# Patient Record
Sex: FEMALE | URBAN - METROPOLITAN AREA
[De-identification: names, ages, dates, MRNs, and addresses within clinical notes are randomized per-mention and may not be internally consistent; named-entity substitution may affect disease eponyms.]

---

## 2021-09-09 ENCOUNTER — APPOINTMENT (RX ONLY)
Dept: URBAN - METROPOLITAN AREA CLINIC 151 | Facility: CLINIC | Age: 71
Setting detail: DERMATOLOGY
End: 2021-09-09

## 2021-09-09 DIAGNOSIS — L20.89 OTHER ATOPIC DERMATITIS: ICD-10-CM

## 2021-09-09 PROBLEM — L20.84 INTRINSIC (ALLERGIC) ECZEMA: Status: ACTIVE | Noted: 2021-09-09

## 2021-09-09 PROCEDURE — 99203 OFFICE O/P NEW LOW 30 MIN: CPT

## 2021-09-09 PROCEDURE — ? PRESCRIPTION

## 2021-09-09 PROCEDURE — ? PRESCRIPTION MEDICATION MANAGEMENT

## 2021-09-09 RX ORDER — TRIAMCINOLONE ACETONIDE 1 MG/G
OINTMENT TOPICAL
Qty: 80 | Refills: 3 | Status: ERX | COMMUNITY
Start: 2021-09-09

## 2021-09-09 RX ORDER — SILVER SULFADIAZINE 10 MG/G
CREAM TOPICAL
Qty: 50 | Refills: 2 | Status: ERX | COMMUNITY
Start: 2021-09-09

## 2021-09-09 RX ADMIN — TRIAMCINOLONE ACETONIDE: 1 OINTMENT TOPICAL at 00:00

## 2021-09-09 RX ADMIN — SILVER SULFADIAZINE: 10 CREAM TOPICAL at 00:00

## 2021-09-09 ASSESSMENT — LOCATION DETAILED DESCRIPTION DERM: LOCATION DETAILED: RIGHT VENTRAL PROXIMAL FOREARM

## 2021-09-09 ASSESSMENT — LOCATION ZONE DERM: LOCATION ZONE: ARM

## 2021-09-09 ASSESSMENT — LOCATION SIMPLE DESCRIPTION DERM: LOCATION SIMPLE: RIGHT FOREARM

## 2021-09-09 NOTE — PROCEDURE: PRESCRIPTION MEDICATION MANAGEMENT
Initiate Treatment: Triamcinolone 0.1% topical ointment QHS under occlusion x/2wks
Detail Level: Simple
Render In Strict Bullet Format?: Yes

## 2021-09-09 NOTE — HPI: OTHER
Condition:: Rash
Please Describe Your Condition:: Following up for an itchy eczema flare up on R forearm, previously used silver sulfadiazine PRN to treat other flares\\nCurrent flare started three weeks ago\\nNo active areas anywhere else\\nTreated w/ prescription topical NOS

## 2021-09-09 NOTE — PROCEDURE: MIPS QUALITY
Quality 226: Preventive Care And Screening: Tobacco Use: Screening And Cessation Intervention: Patient screened for tobacco use and is an ex/non-smoker
Quality 110: Preventive Care And Screening: Influenza Immunization: Influenza Immunization Administered during Influenza season
Quality 47: Advance Care Plan: Advance care planning not documented, reason not otherwise specified.
Quality 431: Preventive Care And Screening: Unhealthy Alcohol Use - Screening: Patient not identified as an unhealthy alcohol user when screened for unhealthy alcohol use using a systematic screening method
Detail Level: Generalized
Quality 130: Documentation Of Current Medications In The Medical Record: Current Medications Documented
Quality 111:Pneumonia Vaccination Status For Older Adults: Pneumococcal Vaccination Previously Received

## 2022-09-06 ENCOUNTER — RX ONLY (OUTPATIENT)
Age: 72
Setting detail: RX ONLY
End: 2022-09-06

## 2022-09-06 RX ORDER — SILVER SULFADIAZINE 10 MG/G
CREAM TOPICAL
Qty: 50 | Refills: 0 | Status: ERX

## 2022-10-18 ENCOUNTER — RX ONLY (OUTPATIENT)
Age: 72
Setting detail: RX ONLY
End: 2022-10-18

## 2022-10-18 RX ORDER — SILVER SULFADIAZINE 10 MG/G
CREAM TOPICAL
Qty: 50 | Refills: 2 | Status: ERX

## 2023-10-27 ENCOUNTER — RX ONLY (OUTPATIENT)
Age: 73
Setting detail: RX ONLY
End: 2023-10-27

## 2023-10-27 RX ORDER — SILVER SULFADIAZINE 10 MG/G
CREAM TOPICAL
Qty: 50 | Refills: 0 | Status: ERX

## 2024-02-27 ENCOUNTER — CONSULTATION (OUTPATIENT)
Dept: URBAN - METROPOLITAN AREA CLINIC 88 | Facility: CLINIC | Age: 74
End: 2024-02-27

## 2024-02-27 DIAGNOSIS — H35.363: ICD-10-CM

## 2024-02-27 DIAGNOSIS — H35.033: ICD-10-CM

## 2024-02-27 DIAGNOSIS — H25.813: ICD-10-CM

## 2024-02-27 DIAGNOSIS — H04.123: ICD-10-CM

## 2024-02-27 PROCEDURE — 92134 CPTRZ OPH DX IMG PST SGM RTA: CPT

## 2024-02-27 PROCEDURE — 99204 OFFICE O/P NEW MOD 45 MIN: CPT

## 2024-02-27 PROCEDURE — 92136 OPHTHALMIC BIOMETRY: CPT

## 2024-02-27 ASSESSMENT — KERATOMETRY
OS_AXISANGLE2_DEGREES: 175
OD_K1POWER_DIOPTERS: 45.25
OD_AXISANGLE2_DEGREES: 167
OS_K2POWER_DIOPTERS: 45.50
OS_AXISANGLE_DEGREES: 85
OD_AXISANGLE_DEGREES: 77
OD_K2POWER_DIOPTERS: 46.00
OS_K1POWER_DIOPTERS: 44.50

## 2024-02-27 ASSESSMENT — TONOMETRY
OD_IOP_MMHG: 16
OS_IOP_MMHG: 16

## 2024-04-19 ASSESSMENT — VISUAL ACUITY
OD_SC: 20/30
OS_GLARE: 20/50
OS_PH: 20/25+1
OD_PH: 20/25
OD_GLARE: 20/60